# Patient Record
Sex: FEMALE | Race: WHITE | NOT HISPANIC OR LATINO
[De-identification: names, ages, dates, MRNs, and addresses within clinical notes are randomized per-mention and may not be internally consistent; named-entity substitution may affect disease eponyms.]

---

## 2017-07-17 PROBLEM — Z00.00 ENCOUNTER FOR PREVENTIVE HEALTH EXAMINATION: Status: ACTIVE | Noted: 2017-07-17

## 2017-07-18 ENCOUNTER — APPOINTMENT (OUTPATIENT)
Dept: CARDIOLOGY | Facility: CLINIC | Age: 67
End: 2017-07-18

## 2017-07-18 VITALS
SYSTOLIC BLOOD PRESSURE: 182 MMHG | HEIGHT: 66 IN | DIASTOLIC BLOOD PRESSURE: 68 MMHG | HEART RATE: 85 BPM | OXYGEN SATURATION: 99 % | BODY MASS INDEX: 16.55 KG/M2 | WEIGHT: 103 LBS

## 2017-07-18 DIAGNOSIS — Z85.3 PERSONAL HISTORY OF MALIGNANT NEOPLASM OF BREAST: ICD-10-CM

## 2017-07-18 DIAGNOSIS — Z78.9 OTHER SPECIFIED HEALTH STATUS: ICD-10-CM

## 2017-07-18 RX ORDER — GLUCOSAM/CHONDRO/HERB 149/HYAL 750-100 MG
TABLET ORAL
Refills: 0 | Status: ACTIVE | COMMUNITY

## 2017-07-20 ENCOUNTER — APPOINTMENT (OUTPATIENT)
Dept: CARDIOLOGY | Facility: CLINIC | Age: 67
End: 2017-07-20

## 2017-07-26 ENCOUNTER — APPOINTMENT (OUTPATIENT)
Dept: CARDIOLOGY | Facility: CLINIC | Age: 67
End: 2017-07-26

## 2017-08-01 ENCOUNTER — APPOINTMENT (OUTPATIENT)
Dept: CARDIOLOGY | Facility: CLINIC | Age: 67
End: 2017-08-01
Payer: MEDICARE

## 2017-08-01 VITALS
BODY MASS INDEX: 26.63 KG/M2 | OXYGEN SATURATION: 97 % | WEIGHT: 165 LBS | DIASTOLIC BLOOD PRESSURE: 68 MMHG | SYSTOLIC BLOOD PRESSURE: 118 MMHG | HEART RATE: 82 BPM

## 2017-08-01 PROCEDURE — 99214 OFFICE O/P EST MOD 30 MIN: CPT

## 2017-08-14 ENCOUNTER — OUTPATIENT (OUTPATIENT)
Dept: OUTPATIENT SERVICES | Facility: HOSPITAL | Age: 67
LOS: 1 days | End: 2017-08-14
Payer: MEDICARE

## 2017-08-14 ENCOUNTER — TRANSCRIPTION ENCOUNTER (OUTPATIENT)
Age: 67
End: 2017-08-14

## 2017-08-14 VITALS
OXYGEN SATURATION: 98 % | HEART RATE: 67 BPM | RESPIRATION RATE: 16 BRPM | TEMPERATURE: 98 F | DIASTOLIC BLOOD PRESSURE: 87 MMHG | SYSTOLIC BLOOD PRESSURE: 134 MMHG

## 2017-08-14 VITALS
OXYGEN SATURATION: 96 % | RESPIRATION RATE: 19 BRPM | SYSTOLIC BLOOD PRESSURE: 120 MMHG | DIASTOLIC BLOOD PRESSURE: 57 MMHG | HEART RATE: 78 BPM

## 2017-08-14 DIAGNOSIS — I25.10 ATHEROSCLEROTIC HEART DISEASE OF NATIVE CORONARY ARTERY WITHOUT ANGINA PECTORIS: ICD-10-CM

## 2017-08-14 DIAGNOSIS — Z90.11 ACQUIRED ABSENCE OF RIGHT BREAST AND NIPPLE: Chronic | ICD-10-CM

## 2017-08-14 DIAGNOSIS — Z98.51 TUBAL LIGATION STATUS: Chronic | ICD-10-CM

## 2017-08-14 DIAGNOSIS — R94.39 ABNORMAL RESULT OF OTHER CARDIOVASCULAR FUNCTION STUDY: ICD-10-CM

## 2017-08-14 LAB
APTT BLD: 30.8 SEC — SIGNIFICANT CHANGE UP (ref 27.5–37.4)
INR BLD: 1.04 RATIO — SIGNIFICANT CHANGE UP (ref 0.88–1.16)
PROTHROM AB SERPL-ACNC: 11.4 SEC — SIGNIFICANT CHANGE UP (ref 9.8–12.7)

## 2017-08-14 PROCEDURE — 36415 COLL VENOUS BLD VENIPUNCTURE: CPT

## 2017-08-14 PROCEDURE — 93005 ELECTROCARDIOGRAM TRACING: CPT

## 2017-08-14 PROCEDURE — 85730 THROMBOPLASTIN TIME PARTIAL: CPT

## 2017-08-14 PROCEDURE — C1769: CPT

## 2017-08-14 PROCEDURE — C1887: CPT

## 2017-08-14 PROCEDURE — 85610 PROTHROMBIN TIME: CPT

## 2017-08-14 PROCEDURE — C1894: CPT

## 2017-08-14 PROCEDURE — 93458 L HRT ARTERY/VENTRICLE ANGIO: CPT | Mod: 26

## 2017-08-14 PROCEDURE — 93010 ELECTROCARDIOGRAM REPORT: CPT

## 2017-08-14 PROCEDURE — 93458 L HRT ARTERY/VENTRICLE ANGIO: CPT

## 2017-08-14 RX ORDER — ASPIRIN/CALCIUM CARB/MAGNESIUM 324 MG
0 TABLET ORAL
Qty: 0 | Refills: 0 | COMMUNITY

## 2017-08-14 RX ORDER — SODIUM CHLORIDE 9 MG/ML
1000 INJECTION INTRAMUSCULAR; INTRAVENOUS; SUBCUTANEOUS
Qty: 0 | Refills: 0 | Status: DISCONTINUED | OUTPATIENT
Start: 2017-08-14 | End: 2017-08-29

## 2017-08-14 RX ORDER — CHOLECALCIFEROL (VITAMIN D3) 125 MCG
1 CAPSULE ORAL
Qty: 0 | Refills: 0 | COMMUNITY

## 2017-08-14 RX ORDER — GLUCOSAMINE/MSM/CHONDROITIN A 750-375MG
1 TABLET ORAL
Qty: 0 | Refills: 0 | COMMUNITY

## 2017-08-14 RX ORDER — FOLIC ACID/VIT B COMPLEX AND C 400 MCG
1 TABLET ORAL
Qty: 0 | Refills: 0 | COMMUNITY

## 2017-08-14 RX ADMIN — SODIUM CHLORIDE 50 MILLILITER(S): 9 INJECTION INTRAMUSCULAR; INTRAVENOUS; SUBCUTANEOUS at 11:59

## 2017-08-14 NOTE — DISCHARGE NOTE ADULT - PLAN OF CARE
OPTIMAL CARDIAC FUNCTION Choose lean meats and poultry without skin and prepare them without added saturated and trans fat.  Eat fish at least twice a week. Recent research shows that eating oily fish containing omega-3 fatty acids (for example, salmon, trout and herring) may help lower your risk of death from coronary artery disease.  Select fat-free, 1 percent fat and low-fat dairy products.  Cut back on foods containing partially hydrogenated vegetable oils to reduce trans fat in your diet.   To lower cholesterol, reduce saturated fat to no more than 5 to 6 percent of total calories. For someone eating 2,000 calories a day, that’s about 13 grams of saturated fat.  Cut back on beverages and foods with added sugars.  Choose and prepare foods with little or no salt. To lower blood pressure, aim to eat no more than 2,400 milligrams of sodium per day. Reducing daily intake to 1,500 mg is desirable because it can lower blood pressure even further.  If you drink alcohol, drink in moderation. That means one drink per day if you’re a woman and two drinks  per day if you’re a man.  Follow the American Heart Association recommendations when you eat out, and keep an eye on your portion sizes.

## 2017-08-14 NOTE — H&P PST ADULT - NEGATIVE GASTROINTESTINAL SYMPTOMS
no vomiting/no change in bowel habits/no diarrhea/no abdominal pain/no flatulence/no constipation/no nausea

## 2017-08-14 NOTE — H&P PST ADULT - NEGATIVE OPHTHALMOLOGIC SYMPTOMS
no blurred vision L/no photophobia/no diplopia/no discharge L/no lacrimation R/no blurred vision R/no discharge R/no lacrimation L

## 2017-08-14 NOTE — H&P PST ADULT - NEGATIVE NEUROLOGICAL SYMPTOMS
no tremors/no weakness/no transient paralysis/no syncope/no paresthesias/no vertigo/no loss of sensation/no generalized seizures/no focal seizures/no difficulty walking

## 2017-08-14 NOTE — H&P PST ADULT - NEGATIVE MUSCULOSKELETAL SYMPTOMS
no neck pain/no muscle weakness/no arthralgia/no myalgia/no stiffness/no arthritis/no joint swelling/no muscle cramps

## 2017-08-14 NOTE — H&P PST ADULT - NSANTHOSAYNRD_GEN_A_CORE
No. DESHAWN screening performed.  STOP BANG Legend: 0-2 = LOW Risk; 3-4 = INTERMEDIATE Risk; 5-8 = HIGH Risk

## 2017-08-14 NOTE — DISCHARGE NOTE ADULT - MEDICATION SUMMARY - MEDICATIONS TO TAKE
I will START or STAY ON the medications listed below when I get home from the hospital:    aspirin 81 mg oral tablet  --  by mouth once a day (after a meal)  -- Indication: For cad prevention    Glucosamine & Chondroitin with MSM  -- 1 tab(s)  2 times a day  -- Indication: For preventative    Calcium 600+D oral tablet  --  by mouth once a day  -- Indication: For Supplement    vitamin A  -- Indication: For vitamin    Vitamin D3 2000 intl units oral tablet  -- 1 tab(s) by mouth once a day  -- Indication: For vitamin

## 2017-08-14 NOTE — DISCHARGE NOTE ADULT - HOSPITAL COURSE
s/p LHC via RRA  Diagnostic procedure  Tolerated procedure well w/o complication  Seen post procedure by Dr. Nichols          REVIEW OF SYSTEMS:  Denies SOB, CP, NV, HA, dizziness, palpitations, site pain    PHYSICAL EXAM: A&Ox3 NAD Skin warm and dry  NEURO: Speech intact +gag +swallow Tongue midline BOWLES  NECK: No JVD, trachea midline. Eupneic  HEART: RRR S1S2 Sr  PULMONARY:  CTA memo  ABDOMEN: Soft nontender X4 +BS Vdg/eating  EXTREMITIES: Rt Radial site: Rt radial pulse + w/pulse ox on right index finger SaO2>95% RUE w/oneurovascular deficit. Capillary refill <3 sec    A- s/p LHC RRA    P-Rt radial site care instructions reviewed with patient   F/U Dr. Nichols 1-2 weeks

## 2017-08-14 NOTE — DISCHARGE NOTE ADULT - CARE PLAN
Principal Discharge DX:	Abnormal stress test  Goal:	OPTIMAL CARDIAC FUNCTION  Instructions for follow-up, activity and diet:	Choose lean meats and poultry without skin and prepare them without added saturated and trans fat.  Eat fish at least twice a week. Recent research shows that eating oily fish containing omega-3 fatty acids (for example, salmon, trout and herring) may help lower your risk of death from coronary artery disease.  Select fat-free, 1 percent fat and low-fat dairy products.  Cut back on foods containing partially hydrogenated vegetable oils to reduce trans fat in your diet.   To lower cholesterol, reduce saturated fat to no more than 5 to 6 percent of total calories. For someone eating 2,000 calories a day, that’s about 13 grams of saturated fat.  Cut back on beverages and foods with added sugars.  Choose and prepare foods with little or no salt. To lower blood pressure, aim to eat no more than 2,400 milligrams of sodium per day. Reducing daily intake to 1,500 mg is desirable because it can lower blood pressure even further.  If you drink alcohol, drink in moderation. That means one drink per day if you’re a woman and two drinks  per day if you’re a man.  Follow the American Heart Association recommendations when you eat out, and keep an eye on your portion sizes.

## 2017-08-14 NOTE — DISCHARGE NOTE ADULT - PATIENT PORTAL LINK FT
“You can access the Follow[facility] Patient Portal, offered by [facility], by registering with the following website: [email or web address]”

## 2017-08-14 NOTE — H&P PST ADULT - HISTORY OF PRESENT ILLNESS
66 y/o black female presents for PST to undergo cardiac catheterization to evaluate abnorman exercise stress test which showed 1mm upsloping depression of ST segments  Anginal equivalent class II for DOWNS exhibited when walking up a hill

## 2017-08-14 NOTE — H&P PST ADULT - ASSESSMENT
68 y/o black female with h/o breast cancer right partial mastectomy w/RT and DOWNS and abnormal exercise stress test showing ST 1mm upstroking w/depression rendered for DOWNS

## 2017-08-14 NOTE — H&P PST ADULT - RS GEN PE MLT RESP DETAILS PC
respirations non-labored/clear to auscultation bilaterally/breath sounds equal/airway patent/good air movement/normal

## 2017-08-14 NOTE — DISCHARGE NOTE ADULT - INSTRUCTIONS
Restricted use with no heavy lifting of affected arm for 48 hours.  No submerging the arm in water for 48 hours.  You may start showering today.  Call your doctor for any bleeding, swelling, loss of sensation in the hand or fingers, or fingers turning blue.  If heavy bleeding or large lumps form, hold pressure at the spot and come to the Emergency Room.  REMOVE RIGHT WRIST DRESSING IN 24 HOURS

## 2017-08-14 NOTE — DISCHARGE NOTE ADULT - MEDICATION SUMMARY - MEDICATIONS TO STOP TAKING
I will STOP taking the medications listed below when I get home from the hospital:    aspirin 81 mg oral tablet  --  by mouth every other day

## 2017-08-14 NOTE — H&P PST ADULT - NEGATIVE ENMT SYMPTOMS
no nasal discharge/no hearing difficulty/no sinus symptoms/no ear pain/no vertigo/no nasal congestion/no tinnitus

## 2017-08-14 NOTE — DISCHARGE NOTE ADULT - NS AS ACTIVITY OBS
No Heavy lifting/straining/Do not make important decisions/Showering allowed/Do not drive or operate machinery

## 2017-08-14 NOTE — DISCHARGE NOTE ADULT - CARE PROVIDER_API CALL
Rosamaria Nichols), Cardiology; Internal Medicine; Interventional Cardiology  [address] [facility]  [address] [place] [address]  Phone: [phone]  Fax: [phone]

## 2017-08-17 ENCOUNTER — APPOINTMENT (OUTPATIENT)
Dept: CARDIOLOGY | Facility: CLINIC | Age: 67
End: 2017-08-17
Payer: MEDICARE

## 2017-08-17 VITALS
WEIGHT: 160 LBS | DIASTOLIC BLOOD PRESSURE: 72 MMHG | BODY MASS INDEX: 25.71 KG/M2 | SYSTOLIC BLOOD PRESSURE: 125 MMHG | HEART RATE: 80 BPM | HEIGHT: 66 IN | OXYGEN SATURATION: 96 %

## 2017-08-17 PROCEDURE — 99214 OFFICE O/P EST MOD 30 MIN: CPT

## 2018-07-16 PROBLEM — C50.919 MALIGNANT NEOPLASM OF UNSPECIFIED SITE OF UNSPECIFIED FEMALE BREAST: Chronic | Status: ACTIVE | Noted: 2017-08-14

## 2019-07-15 PROBLEM — I51.9 HEART DISEASE, UNSPECIFIED: Chronic | Status: ACTIVE | Noted: 2017-08-14

## 2019-07-15 PROBLEM — R94.39 ABNORMAL RESULT OF OTHER CARDIOVASCULAR FUNCTION STUDY: Chronic | Status: ACTIVE | Noted: 2017-08-14

## 2019-07-15 PROBLEM — R06.02 SHORTNESS OF BREATH: Chronic | Status: ACTIVE | Noted: 2017-08-14

## 2019-07-15 PROBLEM — R06.09 OTHER FORMS OF DYSPNEA: Chronic | Status: ACTIVE | Noted: 2017-08-14

## 2019-07-19 ENCOUNTER — APPOINTMENT (OUTPATIENT)
Dept: CARDIOLOGY | Facility: CLINIC | Age: 69
End: 2019-07-19
Payer: MEDICARE

## 2019-07-19 VITALS
HEART RATE: 62 BPM | BODY MASS INDEX: 24.64 KG/M2 | WEIGHT: 157 LBS | HEIGHT: 67 IN | DIASTOLIC BLOOD PRESSURE: 60 MMHG | OXYGEN SATURATION: 98 % | SYSTOLIC BLOOD PRESSURE: 110 MMHG

## 2019-07-19 DIAGNOSIS — R94.39 ABNORMAL RESULT OF OTHER CARDIOVASCULAR FUNCTION STUDY: ICD-10-CM

## 2019-07-19 PROCEDURE — 99214 OFFICE O/P EST MOD 30 MIN: CPT

## 2019-07-19 RX ORDER — ASPIRIN 81 MG
81 TABLET, DELAYED RELEASE (ENTERIC COATED) ORAL
Refills: 0 | Status: ACTIVE | COMMUNITY

## 2019-07-19 RX ORDER — BERBERINE CHLOR/SEAWEED/CHROM 500-250 MG
CAPSULE ORAL
Refills: 0 | Status: ACTIVE | COMMUNITY

## 2019-07-19 NOTE — HISTORY OF PRESENT ILLNESS
[FreeTextEntry1] : 67 year old female with history of chronic mild dyspnea on exertion.  [place]ives [place] most of the year and has noted dyspnea when going up hills.  Denied chest pain/palpitations/syncope/PND/orthopnea.  Follows a good diet.  No FH of CAD.\par \par 7/12/2019: sinus bradycardia. Possible LAE, non-specific T wave changes.\par \par 8/1/17: s/p treadmill exercise stress test: 1mm ST depressions \par \par 8/17/17: s/p cardiac cath: normal cors with LVEF 60%.  \par \par 7/20/2017: EF 60%. Thickened MV with trace MR.

## 2019-07-19 NOTE — PHYSICAL EXAM
[General Appearance - Well Developed] : well developed [Normal Appearance] : normal appearance [Well Groomed] : well groomed [General Appearance - In No Acute Distress] : no acute distress [Normal Conjunctiva] : the conjunctiva exhibited no abnormalities [Eyelids - No Xanthelasma] : the eyelids demonstrated no xanthelasmas [No Oral Pallor] : no oral pallor [No Oral Cyanosis] : no oral cyanosis [FreeTextEntry1] : No JVD, no carotid artery bruits ausculated bilaterally [Respiration, Rhythm And Depth] : normal respiratory rhythm and effort [Exaggerated Use Of Accessory Muscles For Inspiration] : no accessory muscle use [Auscultation Breath Sounds / Voice Sounds] : lungs were clear to auscultation bilaterally [Heart Rate And Rhythm] : heart rate and rhythm were normal [Heart Sounds] : normal S1 and S2 [Murmurs] : no murmurs present [Edema] : no peripheral edema present [Abdomen Mass (___ Cm)] : no abdominal mass palpated [Abnormal Walk] : normal gait [Gait - Sufficient For Exercise Testing] : the gait was sufficient for exercise testing [Nail Clubbing] : no clubbing of the fingernails [Cyanosis, Localized] : no localized cyanosis [Petechial Hemorrhages (___cm)] : no petechial hemorrhages [Skin Color & Pigmentation] : normal skin color and pigmentation [] : no rash [No Venous Stasis] : no venous stasis [Skin Lesions] : no skin lesions [No Skin Ulcers] : no skin ulcer [No Xanthoma] : no  xanthoma was observed [Impaired Insight] : insight and judgment were intact [Affect] : the affect was normal [Mood] : the mood was normal [No Anxiety] : not feeling anxious [5th Left ICS - MCL] : palpated at the 5th LICS in the midclavicular line [Normal Rate] : normal [Rhythm Regular] : regular [Normal S1] : normal S1 [Normal S2] : normal S2 [S3] : no S3 [S4] : no S4 [II] : a grade 2 [Right Carotid Bruit] : no bruit heard over the right carotid [Left Carotid Bruit] : no bruit heard over the left carotid [Right Femoral Bruit] : no bruit heard over the right femoral artery [Left Femoral Bruit] : no bruit heard over the left femoral artery [2+] : left 2+ [No Abnormalities] : the abdominal aorta was not enlarged and no bruit was heard [No Pitting Edema] : no pitting edema present

## 2020-10-23 NOTE — H&P PST ADULT - PMH
Abnormal stress test  August 2017   ST 1mm upstroke w/depressions  Breast cancer in female  2014   RT at [facility]  Diastolic dysfunction  stage 1 per August 2017 echo  EF 60%  DOWNS (dyspnea on exertion)    SOB (shortness of breath)
psychiatric evaluation requested by [facility] before return

## 2022-07-19 ENCOUNTER — APPOINTMENT (OUTPATIENT)
Dept: CARDIOLOGY | Facility: CLINIC | Age: 72
End: 2022-07-19

## 2022-07-19 VITALS
HEART RATE: 82 BPM | HEIGHT: 66 IN | OXYGEN SATURATION: 97 % | WEIGHT: 153 LBS | BODY MASS INDEX: 24.59 KG/M2 | DIASTOLIC BLOOD PRESSURE: 76 MMHG | SYSTOLIC BLOOD PRESSURE: 110 MMHG

## 2022-07-19 PROCEDURE — 99214 OFFICE O/P EST MOD 30 MIN: CPT

## 2022-07-19 RX ORDER — ALENDRONATE SODIUM 70 MG/1
70 TABLET ORAL
Qty: 12 | Refills: 0 | Status: ACTIVE | COMMUNITY
Start: 2022-07-15

## 2022-07-19 NOTE — HISTORY OF PRESENT ILLNESS
[FreeTextEntry1] : Historical Perspective:\par 72 year old female with history of chronic mild dyspnea on exertion.  [place] most of the year and has noted dyspnea when going up hills.  Denied chest pain/palpitations/syncope/PND/orthopnea.  Follows a good diet.  No FH of CAD.\par \par \par \par \par \par

## 2022-07-19 NOTE — PHYSICAL EXAM
[Normal S1, S2] : normal S1, S2 [Murmur] : murmur [Normal] : moves all extremities, no focal deficits, normal speech [de-identified] : No carotid bruits auscultated bilaterally [de-identified] : 2/6 systolic ejection murmur RUSB

## 2022-07-19 NOTE — CARDIOLOGY SUMMARY
[de-identified] : 7/12/2022, NSR with possible LAE, septal Q waves. [de-identified] : 8/1/17: s/p treadmill exercise stress test: 1mm ST depressions  [de-identified] : 7/20/2017: EF 60%. Thickened MV with trace MR. [de-identified] : 8/17/17: s/p cardiac cath: normal cors with LVEF 60%.

## 2022-07-19 NOTE — DISCUSSION/SUMMARY
[FreeTextEntry1] : 1. Dyspnea: stable and mild. Previous cardiac testing above. Recommend echocardiogram follow up.\par \par 2. HLD: patient prefers red yeast rice. \par \par Office will call with results. \par \par Follow up in 1 year.

## 2022-08-05 ENCOUNTER — APPOINTMENT (OUTPATIENT)
Dept: CARDIOLOGY | Facility: CLINIC | Age: 72
End: 2022-08-05

## 2022-08-05 PROCEDURE — 93306 TTE W/DOPPLER COMPLETE: CPT

## 2022-08-16 ENCOUNTER — NON-APPOINTMENT (OUTPATIENT)
Age: 72
End: 2022-08-16

## 2022-08-22 ENCOUNTER — NON-APPOINTMENT (OUTPATIENT)
Age: 72
End: 2022-08-22

## 2023-07-17 ENCOUNTER — RESULT CHARGE (OUTPATIENT)
Age: 73
End: 2023-07-17

## 2023-07-18 ENCOUNTER — APPOINTMENT (OUTPATIENT)
Dept: CARDIOLOGY | Facility: CLINIC | Age: 73
End: 2023-07-18
Payer: MEDICARE

## 2023-07-18 ENCOUNTER — NON-APPOINTMENT (OUTPATIENT)
Age: 73
End: 2023-07-18

## 2023-07-18 VITALS
BODY MASS INDEX: 25.23 KG/M2 | HEIGHT: 66 IN | SYSTOLIC BLOOD PRESSURE: 124 MMHG | OXYGEN SATURATION: 98 % | HEART RATE: 68 BPM | WEIGHT: 157 LBS | DIASTOLIC BLOOD PRESSURE: 72 MMHG

## 2023-07-18 DIAGNOSIS — E78.00 PURE HYPERCHOLESTEROLEMIA, UNSPECIFIED: ICD-10-CM

## 2023-07-18 DIAGNOSIS — Z01.810 ENCOUNTER FOR PREPROCEDURAL CARDIOVASCULAR EXAMINATION: ICD-10-CM

## 2023-07-18 DIAGNOSIS — R06.09 OTHER FORMS OF DYSPNEA: ICD-10-CM

## 2023-07-18 PROCEDURE — 99214 OFFICE O/P EST MOD 30 MIN: CPT

## 2023-07-18 PROCEDURE — 93000 ELECTROCARDIOGRAM COMPLETE: CPT | Mod: NC

## 2023-07-18 RX ORDER — CHOLECALCIFEROL (VITAMIN D3) 25 MCG
TABLET ORAL
Refills: 0 | Status: ACTIVE | COMMUNITY

## 2023-07-18 NOTE — HISTORY OF PRESENT ILLNESS
[FreeTextEntry1] : Historical Perspective:\par 73 year old female with history of chronic mild dyspnea on exertion.  [place] most of the year and has noted dyspnea when going up hills.  Denied chest pain/palpitations/syncope/PND/orthopnea.  \par \par There is no history of MI, CVA, CHF, or previous coronary intervention.\par \par Current Health Status:\par Patient with no chest pain, SOB, or palpitations. No hospitalizations since seeing me last. Remains compliant with his medications and reports no adverse effects. Patient having colonoscopy tomorrow.\par \par \par \par \par

## 2023-07-18 NOTE — CARDIOLOGY SUMMARY
[de-identified] : 7/18/2023, NSR with possible RICHARD\par 7/12/2022, NSR with possible LAE, septal Q waves. [de-identified] : 8/1/17: s/p treadmill exercise stress test: 1mm ST depressions  [de-identified] : 8/5/2022, LV EF 60%, trace MR, normal LV diastolic function, mild TR with estimated PASP of 21mmHg.\par 7/20/2017: EF 60%. Thickened MV with trace MR. [de-identified] : 8/17/17: s/p cardiac cath: normal cors with LVEF 60%.

## 2023-07-18 NOTE — DISCUSSION/SUMMARY
[FreeTextEntry1] : 1. Dyspnea: stable and mild. Previous cardiac testing above. \par \par 2. HLD: patient prefers red yeast rice. \par \par The patient has greater than 4 METs activity level without exertional complaints. There are no acute ECG changes, no murmur of aortic stenosis, and no clinical signs of heart failure. Patient is optimized from a cardiology standpoint to undergo the planned surgical procedure.\par \par Follow up in 1 year.  [EKG obtained to assist in diagnosis and management of assessed problem(s)] : EKG obtained to assist in diagnosis and management of assessed problem(s)

## 2023-07-18 NOTE — PHYSICAL EXAM
[Normal S1, S2] : normal S1, S2 [Murmur] : murmur [Normal] : moves all extremities, no focal deficits, normal speech [de-identified] : No carotid bruits auscultated bilaterally [de-identified] : 2/6 systolic ejection murmur RUSB

## 2024-02-13 NOTE — ASU PATIENT PROFILE, ADULT - PATIENT KNOW
Detail Level: Detailed
Add 1585x Cpt? (Do Not Bill If You Billed For The Procedure Placing The Sutures. This Is An Add-On Code That Must Be Billed With An E/M Visit Code): No
yes

## 2024-08-01 ENCOUNTER — APPOINTMENT (OUTPATIENT)
Dept: CARDIOLOGY | Facility: CLINIC | Age: 74
End: 2024-08-01
Payer: MEDICARE

## 2024-08-01 VITALS
WEIGHT: 151 LBS | SYSTOLIC BLOOD PRESSURE: 100 MMHG | BODY MASS INDEX: 24.27 KG/M2 | HEART RATE: 80 BPM | OXYGEN SATURATION: 96 % | HEIGHT: 66 IN | DIASTOLIC BLOOD PRESSURE: 58 MMHG

## 2024-08-01 DIAGNOSIS — E78.00 PURE HYPERCHOLESTEROLEMIA, UNSPECIFIED: ICD-10-CM

## 2024-08-01 DIAGNOSIS — R06.09 OTHER FORMS OF DYSPNEA: ICD-10-CM

## 2024-08-01 PROCEDURE — G2211 COMPLEX E/M VISIT ADD ON: CPT

## 2024-08-01 PROCEDURE — 99214 OFFICE O/P EST MOD 30 MIN: CPT

## 2024-08-01 RX ORDER — ATORVASTATIN CALCIUM 10 MG/1
10 TABLET, FILM COATED ORAL DAILY
Refills: 0 | Status: ACTIVE | COMMUNITY

## 2024-08-01 NOTE — DISCUSSION/SUMMARY
[FreeTextEntry1] : 1. Dyspnea: stable and mild. Previous cardiac testing above.   2. HLD: continue atorvastatin 10mg daily.  Follow up in 1 year.

## 2024-08-01 NOTE — PHYSICAL EXAM
[Normal S1, S2] : normal S1, S2 [Murmur] : murmur [Normal] : moves all extremities, no focal deficits, normal speech [de-identified] : No carotid bruits auscultated bilaterally [de-identified] : 2/6 systolic ejection murmur RUSB

## 2024-08-01 NOTE — CARDIOLOGY SUMMARY
[de-identified] : 7/18/2023, NSR with possible RICHARD\par  7/12/2022, NSR with possible LAE, septal Q waves. [de-identified] : 8/1/17: s/p treadmill exercise stress test: 1mm ST depressions  [de-identified] : 8/5/2022, LV EF 60%, trace MR, normal LV diastolic function, mild TR with estimated PASP of 21mmHg.\par  7/20/2017: EF 60%. Thickened MV with trace MR. [de-identified] : 8/17/17: s/p cardiac cath: normal cors with LVEF 60%.

## 2024-08-01 NOTE — HISTORY OF PRESENT ILLNESS
[FreeTextEntry1] : Historical Perspective: 73 year old female with history of chronic mild dyspnea on exertion.  [place]ives [place] most of the year and has noted dyspnea when going up hills.  Denied chest pain/palpitations/syncope/PND/orthopnea.    There is no history of MI, CVA, CHF, or previous coronary intervention.  Current Health Status: Patient with no chest pain, SOB, or palpitations. Hospitalized in May 2024 for influenza. Feels well now. Remains compliant with his medications and reports no adverse effects.

## 2025-07-16 ENCOUNTER — APPOINTMENT (OUTPATIENT)
Dept: CARDIOLOGY | Facility: CLINIC | Age: 75
End: 2025-07-16
Payer: MEDICARE

## 2025-07-16 VITALS
SYSTOLIC BLOOD PRESSURE: 112 MMHG | OXYGEN SATURATION: 96 % | HEIGHT: 66 IN | DIASTOLIC BLOOD PRESSURE: 60 MMHG | HEART RATE: 60 BPM | BODY MASS INDEX: 25.39 KG/M2 | WEIGHT: 158 LBS

## 2025-07-16 PROCEDURE — 93000 ELECTROCARDIOGRAM COMPLETE: CPT

## 2025-07-16 PROCEDURE — G2211 COMPLEX E/M VISIT ADD ON: CPT

## 2025-07-16 PROCEDURE — 99213 OFFICE O/P EST LOW 20 MIN: CPT
